# Patient Record
Sex: MALE | Race: WHITE | NOT HISPANIC OR LATINO | Employment: STUDENT | URBAN - NONMETROPOLITAN AREA
[De-identification: names, ages, dates, MRNs, and addresses within clinical notes are randomized per-mention and may not be internally consistent; named-entity substitution may affect disease eponyms.]

---
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
## 2025-02-06 ENCOUNTER — OFFICE VISIT (OUTPATIENT)
Dept: FAMILY MEDICINE | Facility: CLINIC | Age: 20
End: 2025-02-06
Payer: COMMERCIAL

## 2025-02-06 VITALS
TEMPERATURE: 98 F | SYSTOLIC BLOOD PRESSURE: 127 MMHG | HEIGHT: 75 IN | HEART RATE: 101 BPM | BODY MASS INDEX: 20.94 KG/M2 | DIASTOLIC BLOOD PRESSURE: 82 MMHG | OXYGEN SATURATION: 100 % | RESPIRATION RATE: 19 BRPM | WEIGHT: 168.38 LBS

## 2025-02-06 DIAGNOSIS — J02.9 SORETHROAT: ICD-10-CM

## 2025-02-06 DIAGNOSIS — H61.22 IMPACTED CERUMEN OF LEFT EAR: ICD-10-CM

## 2025-02-06 DIAGNOSIS — J01.00 ACUTE MAXILLARY SINUSITIS, RECURRENCE NOT SPECIFIED: Primary | ICD-10-CM

## 2025-02-06 LAB
CTP QC/QA: YES
MOLECULAR STREP A: NEGATIVE

## 2025-02-06 PROCEDURE — 3079F DIAST BP 80-89 MM HG: CPT | Mod: CPTII,,, | Performed by: NURSE PRACTITIONER

## 2025-02-06 PROCEDURE — 3008F BODY MASS INDEX DOCD: CPT | Mod: CPTII,,, | Performed by: NURSE PRACTITIONER

## 2025-02-06 PROCEDURE — 1160F RVW MEDS BY RX/DR IN RCRD: CPT | Mod: CPTII,,, | Performed by: NURSE PRACTITIONER

## 2025-02-06 PROCEDURE — 99213 OFFICE O/P EST LOW 20 MIN: CPT | Mod: 25,,, | Performed by: NURSE PRACTITIONER

## 2025-02-06 PROCEDURE — 1159F MED LIST DOCD IN RCRD: CPT | Mod: CPTII,,, | Performed by: NURSE PRACTITIONER

## 2025-02-06 PROCEDURE — 3074F SYST BP LT 130 MM HG: CPT | Mod: CPTII,,, | Performed by: NURSE PRACTITIONER

## 2025-02-06 PROCEDURE — 87651 STREP A DNA AMP PROBE: CPT | Mod: QW,,, | Performed by: NURSE PRACTITIONER

## 2025-02-06 PROCEDURE — 96372 THER/PROPH/DIAG INJ SC/IM: CPT | Mod: ,,, | Performed by: NURSE PRACTITIONER

## 2025-02-06 RX ORDER — CEFTRIAXONE 1 G/1
1 INJECTION, POWDER, FOR SOLUTION INTRAMUSCULAR; INTRAVENOUS
Status: COMPLETED | OUTPATIENT
Start: 2025-02-06 | End: 2025-02-06

## 2025-02-06 RX ORDER — AZITHROMYCIN 250 MG/1
TABLET, FILM COATED ORAL
Qty: 6 TABLET | Refills: 0 | Status: SHIPPED | OUTPATIENT
Start: 2025-02-06 | End: 2025-02-11

## 2025-02-06 RX ADMIN — CEFTRIAXONE 1 G: 1 INJECTION, POWDER, FOR SOLUTION INTRAMUSCULAR; INTRAVENOUS at 12:02

## 2025-02-06 NOTE — PROGRESS NOTES
"   TREVER Newell   [facility]  [address]  [place] [address]     PATIENT NAME: Sergey Li  : 2005  DATE: 25  MRN: [id number]      Billing Provider: TREVER Newell  Level of Service:   Patient PCP Information       Provider PCP Type    TREVER Newell General            Reason for Visit / Chief Complaint: Sore Throat (Pt is here w c/o a sore throat w/o fever it started hurting Tuesday night.)   Health Maintenance Due   Topic Date Due    Hepatitis C Screening  Never done    Lipid Panel  Never done    COVID-19 Vaccine ( season) Never done          Update PCP  Update Chief Complaint         History of Present Illness / Problem Focused Workflow     History of Present Illness    CHIEF COMPLAINT:  Patient presents today for sinus symptoms.    HISTORY OF PRESENT ILLNESS:  He reports sinus symptoms that began Tuesday night while at work, accompanied by cough. He experienced a headache last night which has since resolved. He has been coughing up colored discharge.     ROS:  General: -fever, -chills, -fatigue, -weight gain, -weight loss  Eyes: -vision changes, -redness, -discharge  ENT: -ear pain, +nasal congestion, -sore throat  Cardiovascular: -chest pain, -palpitations, -lower extremity edema  Respiratory: +cough, -shortness of breath, -sputum production  Gastrointestinal: -abdominal pain, -nausea, -vomiting, -diarrhea, -constipation, -blood in stool  Genitourinary: -dysuria, -hematuria, -frequency  Musculoskeletal: -joint pain, -muscle pain  Skin: -rash, -lesion  Neurological: +headache, -dizziness, -numbness, -tingling  Psychiatric: -anxiety, -depression, -sleep difficulty          No results found for: "HGBA1C"     CMP  No results found for: "NA", "K", "CL", "CO2", "GLU", "BUN", "CREATININE", "CALCIUM", "PROT", "ALBUMIN", "BILITOT", "ALKPHOS", "AST", "ALT", "ANIONGAP", "EGFRNORACEVR"     No results found for: "WBC", "RBC", "HGB", "HCT", "MCV", "MCH", "MCHC", "RDW", "PLT", " ""MPV", "GRAN", "LYMPH", "MONO", "EOS", "BASO", "EOSINOPHIL", "BASOPHIL"     No results found for: "CHOL"  No results found for: "HDL"  No results found for: "LDLCALC"  No results found for: "TRIG"  No results found for: "CHOLHDL"     Wt Readings from Last 3 Encounters:   02/06/25 1113 76.4 kg (168 lb 6.4 oz) (72%, Z= 0.57)*   10/15/24 1041 77.4 kg (170 lb 9.6 oz) (75%, Z= 0.68)*   02/22/24 1332 75.5 kg (166 lb 6.4 oz) (74%, Z= 0.64)*     * Growth percentiles are based on [facility] (Boys, 2-20 Years) data.        BP Readings from Last 3 Encounters:   02/06/25 127/82   10/15/24 122/75   02/22/24 107/69        Review of Systems     Review of Systems       Medical / Social / Family History     Past Medical History:   Diagnosis Date    Acute non-recurrent frontal sinusitis 02/05/2024    ADHD (attention deficit hyperactivity disorder)     Fever 11/03/2022    Influenza 11/03/2022    Sore throat 02/05/2024       History reviewed. No pertinent surgical history.    Social History    reports that he has never smoked. He has been exposed to tobacco smoke. He has never used smokeless tobacco. He reports that he does not drink alcohol and does not use drugs.    Family History  's family history is not on file.    Medications and Allergies     Medications  No outpatient medications have been marked as taking for the 2/6/25 encounter (Office Visit) with Blanca Menard FNP.     Current Facility-Administered Medications for the 2/6/25 encounter (Office Visit) with Blanca Menard FNP   Medication Dose Route Frequency Provider Last Rate Last Admin    [COMPLETED] cefTRIAXone injection 1 g  1 g Intramuscular 1 time in Clinic/HOD Blanca Menard FNP   1 g at 02/06/25 1208       Allergies  Review of patient's allergies indicates:  No Known Allergies    Physical Examination     Vitals:    02/06/25 1113   BP: 127/82   BP Location: Right arm   Patient Position: Sitting   Pulse: 101   Resp: 19   Temp: 98.1 °F (36.7 °C)   TempSrc: Oral   SpO2: 100% " "  Weight: 76.4 kg (168 lb 6.4 oz)   Height: 6' 3" (1.905 m)      Physical Exam  Constitutional:       Appearance: Normal appearance.   HENT:      Head: Normocephalic.      Right Ear: Hearing, tympanic membrane, ear canal and external ear normal.      Left Ear: Hearing and external ear normal. There is impacted cerumen.      Ears:      Comments: Left ear with cerumen impaction; left TM normal after ear irrigation performed     Nose: Congestion present.      Right Turbinates: Swollen.      Left Turbinates: Swollen.      Right Sinus: Maxillary sinus tenderness present.      Left Sinus: Maxillary sinus tenderness present.      Mouth/Throat:      Mouth: Mucous membranes are moist.      Pharynx: Postnasal drip present.   Eyes:      Extraocular Movements: Extraocular movements intact.   Cardiovascular:      Rate and Rhythm: Normal rate and regular rhythm.      Pulses: Normal pulses.      Heart sounds: Normal heart sounds.   Pulmonary:      Effort: Pulmonary effort is normal.      Breath sounds: Normal breath sounds.   Skin:     General: Skin is warm and dry.      Capillary Refill: Capillary refill takes less than 2 seconds.   Neurological:      General: No focal deficit present.      Mental Status: He is alert and oriented to person, place, and time.   Psychiatric:         Mood and Affect: Mood normal.         Behavior: Behavior normal.          Assessment and Plan (including Health Maintenance)      Problem List  Smart Sets  Document Outside HM   :    Plan:     There are no Patient Instructions on file for this visit.       Health Maintenance Due   Topic Date Due    Hepatitis C Screening  Never done    Lipid Panel  Never done    COVID-19 Vaccine (1 - 2024-25 season) Never done       Problem List Items Addressed This Visit       Acute maxillary sinusitis - Primary    Relevant Medications    cefTRIAXone injection 1 g (Completed)    azithromycin (Z-CAIT) 250 MG tablet    Sorethroat    Relevant Orders    POCT Strep A, " Molecular (Completed)     Assessment & Plan    IMPRESSION:  - Assessed patient for sinus infection  - Noted left ear appears impacted  - Considered antibiotics due to appearance of left ear and patient's symptoms  - Evaluated cough and recent onset of symptoms (started Tuesday night)    SINUSITIS:  - Examined the patient's sinuses and found the left sinus to be problematic.  - Patient reports sinus problems, coughing, and experienced a headache last night.  - Symptoms started on Tuesday night.  - Offered to perform sinus irrigation to clean out the patient's sinuses.  - May initiate antibiotic therapy pending further assessment.  - Advised the patient to monitor symptoms and report any changes.    EAR INFLAMMATION:  - Performed otoscopic exam of both ears.  - Observed cerumen impaction in one ear. Ear irrigation performed via nurse with large amount of cerumen removed. Pt oz well.   - Will continue to monitor the condition and assess the need for treatment.    RESPIRATORY ASSESSMENT:  - Performed auscultation of lungs to assess respiratory function.    COVID-19:  - Noted that the patient's strep test was negative.         Acute maxillary sinusitis, recurrence not specified  -     cefTRIAXone injection 1 g  -     azithromycin (Z-CAIT) 250 MG tablet; Take 2 tablets by mouth on day 1; Take 1 tablet by mouth on days 2-5  Dispense: 6 tablet; Refill: 0    Sorethroat  -     POCT Strep A, Molecular       Health Maintenance Topics with due status: Not Due       Topic Last Completion Date    RSV Vaccine (Age 60+ and Pregnant patients) Not Due         No future appointments.     No follow-ups on file.    Health Maintenance Due   Topic Date Due    Hepatitis C Screening  Never done    Lipid Panel  Never done    COVID-19 Vaccine (1 - 2024-25 season) Never done        Signature:  TREVER Newell    Date of encounter: 2/6/25  This note was generated with the assistance of ambient listening technology. Verbal consent was obtained  by the patient and accompanying visitor(s) for the recording of patient appointment to facilitate this note. I attest to having reviewed and edited the generated note for accuracy, though some syntax or spelling errors may persist. Please contact the author of this note for any clarification.

## 2025-02-06 NOTE — LETTER
February 6, 2025      [facility] [address] [place] [address]  Phone: [phone]  Fax: [phone]       Patient: Sergey Li   YOB: 2005  Date of Visit: 02/06/2025    To Whom It May Concern:    Howie Li  was at [facility] on 02/06/2025. The patient may return to work/school on 02/10/25 with no restrictions. If you have any questions or concerns, or if I can be of further assistance, please do not hesitate to contact me.    Sincerely,    Felisha Mares RN